# Patient Record
Sex: MALE | Race: WHITE | URBAN - METROPOLITAN AREA
[De-identification: names, ages, dates, MRNs, and addresses within clinical notes are randomized per-mention and may not be internally consistent; named-entity substitution may affect disease eponyms.]

---

## 2024-01-01 ENCOUNTER — HOSPITAL ENCOUNTER (EMERGENCY)
Age: 39
End: 2024-01-20
Attending: EMERGENCY MEDICINE
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
## 2024-01-01 PROCEDURE — 96374 THER/PROPH/DIAG INJ IV PUSH: CPT

## 2024-01-01 PROCEDURE — 99285 EMERGENCY DEPT VISIT HI MDM: CPT

## 2024-01-01 PROCEDURE — 6360000002 HC RX W HCPCS: Performed by: EMERGENCY MEDICINE

## 2024-01-01 RX ORDER — EPINEPHRINE IN SOD CHLOR,ISO 1 MG/10 ML
SYRINGE (ML) INTRAVENOUS DAILY PRN
Status: COMPLETED | OUTPATIENT
Start: 2024-01-01 | End: 2024-01-01

## 2024-01-01 RX ADMIN — EPINEPHRINE 1 MG: 0.1 INJECTION INTRACARDIAC; INTRAVENOUS at 21:55

## 2024-01-01 RX ADMIN — EPINEPHRINE 1 MG: 0.1 INJECTION INTRACARDIAC; INTRAVENOUS at 21:50

## 2024-01-20 PROCEDURE — 92950 HEART/LUNG RESUSCITATION CPR: CPT

## 2024-01-20 NOTE — ED NOTES
Continues PEA no pulse/ ventilations stopped/ IV fluids stopped/ MD Salamanca stopped \"code\" and called it @ 4959.
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
## 2024-01-20 NOTE — ED NOTES
ETA for [facility] to transport pt to [facility] 0215.
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
## 2024-01-20 NOTE — ED NOTES
ST per monitor w carotid pulse present/ 2 liters NS w/o to left AC. Continues to bag pt/ compressions stopped. Bp 78/48.
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
## 2024-01-20 NOTE — ED NOTES
Pt body remains in room awaiting 's office to pick pt up/ [place] tanmay Shaw present and since patient arrival to unit.

## 2024-01-20 NOTE — ED NOTES
ST per monitor w a pulse/ continues to ventilate/ Levophed 16mg in 250 ml normal saline started @ 5mg per order MD Salamanca. Calls made for [facility] to transport. Bp 71/28.

## 2024-01-20 NOTE — ED NOTES
Notified supervisor Mikayla CORTES @ [facility] of pt being transported to [facility] w eta 0215 via [facility].  and [facility] aware.

## 2024-01-20 NOTE — ED NOTES
Remains PEA w bp 78/45 no pulse/ cpr continues. 2nd IV line started to right ac 18 g per Corina MARES rad tech.
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
